# Patient Record
Sex: MALE | Race: WHITE | NOT HISPANIC OR LATINO | URBAN - METROPOLITAN AREA
[De-identification: names, ages, dates, MRNs, and addresses within clinical notes are randomized per-mention and may not be internally consistent; named-entity substitution may affect disease eponyms.]

---

## 2023-06-22 ENCOUNTER — HOSPITAL ENCOUNTER (EMERGENCY)
Facility: MEDICAL CENTER | Age: 23
End: 2023-06-22
Attending: EMERGENCY MEDICINE

## 2023-06-22 VITALS
RESPIRATION RATE: 12 BRPM | OXYGEN SATURATION: 96 % | HEIGHT: 74 IN | SYSTOLIC BLOOD PRESSURE: 131 MMHG | DIASTOLIC BLOOD PRESSURE: 77 MMHG | BODY MASS INDEX: 20.46 KG/M2 | TEMPERATURE: 97.9 F | WEIGHT: 159.39 LBS | HEART RATE: 64 BPM

## 2023-06-22 DIAGNOSIS — K08.89 PAIN, DENTAL: ICD-10-CM

## 2023-06-22 PROCEDURE — 700102 HCHG RX REV CODE 250 W/ 637 OVERRIDE(OP): Performed by: EMERGENCY MEDICINE

## 2023-06-22 PROCEDURE — 700101 HCHG RX REV CODE 250: Performed by: EMERGENCY MEDICINE

## 2023-06-22 PROCEDURE — 64400 NJX AA&/STRD TRIGEMINAL NRV: CPT

## 2023-06-22 PROCEDURE — A9270 NON-COVERED ITEM OR SERVICE: HCPCS | Performed by: EMERGENCY MEDICINE

## 2023-06-22 PROCEDURE — 99283 EMERGENCY DEPT VISIT LOW MDM: CPT

## 2023-06-22 RX ORDER — PENICILLIN V POTASSIUM 500 MG/1
500 TABLET ORAL EVERY 6 HOURS
Qty: 40 TABLET | Refills: 0 | Status: ACTIVE | OUTPATIENT
Start: 2023-06-22 | End: 2023-07-02

## 2023-06-22 RX ORDER — PENICILLIN V POTASSIUM 500 MG/1
500 TABLET ORAL ONCE
Status: COMPLETED | OUTPATIENT
Start: 2023-06-22 | End: 2023-06-22

## 2023-06-22 RX ORDER — IBUPROFEN 800 MG/1
800 TABLET ORAL EVERY 8 HOURS PRN
Qty: 30 TABLET | Refills: 0 | Status: SHIPPED | OUTPATIENT
Start: 2023-06-22

## 2023-06-22 RX ORDER — BUPIVACAINE HYDROCHLORIDE 5 MG/ML
10 INJECTION, SOLUTION EPIDURAL; INTRACAUDAL ONCE
Status: COMPLETED | OUTPATIENT
Start: 2023-06-22 | End: 2023-06-22

## 2023-06-22 RX ADMIN — BUPIVACAINE HYDROCHLORIDE 10 ML: 5 INJECTION, SOLUTION EPIDURAL; INTRACAUDAL at 07:43

## 2023-06-22 RX ADMIN — PENICILLIN V POTASSIUM 500 MG: 500 TABLET, FILM COATED ORAL at 07:22

## 2023-06-22 NOTE — ED NOTES
"Pt discharged to home with steady gait.  Pt alert and oriented times 4 on room air.  Pt in possession of belongings.  Pt provided discharge education and information pertaining to medications and follow up appointments.  Pt received copy of discharge instructions and verbalized understanding. Encouraged to follow up with PCP. /77   Pulse 64   Temp 36.6 °C (97.9 °F) (Temporal)   Resp 12   Ht 1.88 m (6' 2\")   Wt 72.3 kg (159 lb 6.3 oz)   SpO2 96%   BMI 20.46 kg/m²  '  "

## 2023-06-22 NOTE — ED PROVIDER NOTES
"  ER Provider Note    Scribed for July Dumont M.D. by Jaydon Tsai. 6/22/2023  6:48 AM    Primary Care Provider: Aly Boyd D.O.    CHIEF COMPLAINT  Chief Complaint   Patient presents with    Dental Pain     EXTERNAL RECORDS REVIEWED  None    HPI/ROS  LIMITATION TO HISTORY   Select: : None  OUTSIDE HISTORIAN(S):  None    Aquilino Ordonez a 23 y.o. male who presents to the ED for acute right sided dental pain and swelling. Patient states his pain started around a week ago, and 3 days ago he began to notice swelling as well. He has been taking Advil and Tylenol for the pain with little relief. Patient does not currently follow with a dentist, citing lack of insurance coverage.     PAST MEDICAL HISTORY  Past Medical History:   Diagnosis Date    ADHD (attention deficit hyperactivity disorder)        SURGICAL HISTORY  History reviewed. No pertinent surgical history.    FAMILY HISTORY  History reviewed. No pertinent family history.    SOCIAL HISTORY   reports that he has been smoking cigarettes. He does not have any smokeless tobacco history on file. He reports current alcohol use. He reports current drug use. Drug: Inhaled.    CURRENT MEDICATIONS  Discharge Medication List as of 6/22/2023  8:22 AM        CONTINUE these medications which have NOT CHANGED    Details   amphetamine-dextroamphetamine (ADDERALL) 10 MG Tab Take 10 mg by mouth 2 times a day., Historical Med             ALLERGIES  No Known Allergies     PHYSICAL EXAM  BP (!) 141/91   Pulse 100   Temp 36.1 °C (97 °F) (Temporal)   Resp 16   Ht 1.88 m (6' 2\")   Wt 72.3 kg (159 lb 6.3 oz)   SpO2 95%   BMI 20.46 kg/m²   Constitutional: Alert in no apparent distress. Well apearing  HENT: Normocephalic, Atraumatic, Bilateral external ears normal. Nose normal.   Or other exam: Patient has a narrow mall with his posterior inferior right molar is partially obscured by overlying gingiva.  There is no evidence of erythema or drainable " abscess.  Eyes:  Conjunctiva normal, non-icteric.   Lungs: Non-labored respirations  Skin: Warm, Dry, No erythema, No rash.   Neurologic: Alert, Grossly non-focal.   Psychiatric: Affect normal, Judgment normal, Mood normal, Appears appropriate and not intoxicated.       COURSE & MEDICAL DECISION MAKING     6:48 AM - Patient seen and examined at bedside. Discussed plan of care, including antibiotics and dental block. Patient agrees to the plan of care. The patient will be medicated with Veetid 500 mg.      ED Observation Status? No; patient does not meet criteria for ED observation.    INITIAL ASSESSMENT, COURSE AND PLAN  Care Narrative: 23-year-old gentleman who presents with dental pain.  He has a small mouth and is having his wisdom teeth come in it appears that his wisdom teeth are coming in and the gums are over on top of them.  It appears he does not have significant room for his wisdom teeth to come in and this likely accounts for his pain.  He does not have any drainable abscess on exam he has some mild localized swelling.  Dental block was performed and he tolerated this well he had complete resolution of his pain.  He was advised to stay on scheduled ibuprofen 3 times a day and he will be prescribed antibiotics.  He was given a general referral sheet for outpatient follow-up.  He is agreeable to this plan.    Nerve Block Procedure Note    Indication: Dental pain    Procedure: The patient was positioned appropriately and right inferior alveolar block was performed using 0.5% Bupivacaine without epinephrine.     The patient tolerated the procedure well.    Complications: None      DISPOSITION AND DISCUSSIONS  I have discussed management of the patient with the following physicians and CHOCO's:  None    Discussion of management with other QHP or appropriate source(s): None     Escalation of care considered, and ultimately not performed: blood analysis and diagnostic imaging.    Barriers to care at this time,  including but not limited to: Patient had difficult affording medications and no dental follow up .     Decision tools and prescription drugs considered including, but not limited to: Antibiotics   .    The patient will return for new or worsening symptoms and is stable at the time of discharge.    The patient is referred to a primary physician for blood pressure management, diabetic screening, and for all other preventative health concerns.    DISPOSITION:  Patient will be discharged home in stable condition.    FOLLOW UP:  Aly Boyd D.O.  [address] [facility] [address]  [phone]    Schedule an appointment as soon as possible for a visit       [facility], Emergency Dept  [address] [address] [phone]    Return for worsening pain, fever, or other concerns      OUTPATIENT MEDICATIONS:  Discharge Medication List as of 6/22/2023  8:22 AM        START taking these medications    Details   penicillin v potassium (VEETID) 500 MG Tab Take 1 Tablet by mouth every 6 hours for 10 days., Disp-40 Tablet, R-0, Normal      ibuprofen (MOTRIN) 800 MG Tab Take 1 Tablet by mouth every 8 hours as needed for Mild Pain or Moderate Pain., Disp-30 Tablet, R-0, Normal             FINAL DIANGOSIS  1. Pain, dental         IJaydon (Scribe), am scribing for, and in the presence of, July Dumont M.D..    Electronically signed by: Jaydon Tsai (Scribe), 6/22/2023    IJuly M.D. personally performed the services described in this documentation, as scribed by Jaydon Tsai in my presence, and it is both accurate and complete.    The note accurately reflects work and decisions made by me.  July Dumont M.D.  6/22/2023  1:14 PM

## 2023-06-22 NOTE — ED NOTES
Bedside report received from DEBBY Salmon. Pt awake and alert in hallway bed.  Reports 9/10 mouth pain.  No acute distress at this time.

## 2023-06-22 NOTE — ED TRIAGE NOTES
"Chief Complaint   Patient presents with    Dental Pain   R side, Pain X8 days, edema x3 days, pt states he's having difficulty swallowing, pt does not have insurance and has been unable to see an orthodontist. Pt states \"my wisdom teeth on the R side are coming in.\"    Pt ambulatory to triage for above complaint.     Pt is alert/oriented and follows commands. Pt speaking in full sentences and responds appropriately to questions. No acute distress noted in triage and respirations are even and unlabored.     Pt placed in lobby and educated on triage process. Pt encouraged to alert staff for any changes in condition.    "